# Patient Record
Sex: MALE | Race: BLACK OR AFRICAN AMERICAN | NOT HISPANIC OR LATINO | Employment: UNEMPLOYED | URBAN - METROPOLITAN AREA
[De-identification: names, ages, dates, MRNs, and addresses within clinical notes are randomized per-mention and may not be internally consistent; named-entity substitution may affect disease eponyms.]

---
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
## 2020-07-14 ENCOUNTER — TELEPHONE (OUTPATIENT)
Dept: PEDIATRIC CARDIOLOGY | Facility: CLINIC | Age: 20
End: 2020-07-14

## 2020-07-14 NOTE — TELEPHONE ENCOUNTER
----- Message from Uche Avina MA sent at 7/14/2020  3:44 PM CDT -----  Regarding: Letter  Mom doesn't want Beulah to go to work at the [facility] [employer] of his heart condition. She wanted us to write a letter stating he shouldn't work. I advised mom that we are following CDC guidelines and aren't restricting any of our patients from working. I told her we may could write something up providing his diagnosis. Mom wanted that and it to be sent to fax # [phone] attn: Semaj Oro (Beulah's supervisor) and to call mom at [phone] when we are done.

## 2020-07-14 NOTE — TELEPHONE ENCOUNTER
Called mom- Beulah seen last in Jan 2019 and was asked to RTC in 6 months. Have a general guideline sheet from last visit with diagnoses on top- happy to send to manager. Can evaluate any further activity recommendations at visit on 08/04/2020.    Called mom to review/update- mom understands and agrees with sending general guideline sheet at this time. Told mom we can evaluate further at f/u on 08/04/2020- mom verbalizes understanding. All questions answered.

## 2020-08-04 ENCOUNTER — CLINICAL SUPPORT (OUTPATIENT)
Dept: PEDIATRIC CARDIOLOGY | Facility: CLINIC | Age: 20
End: 2020-08-04
Payer: MEDICAID

## 2020-08-04 ENCOUNTER — CLINICAL SUPPORT (OUTPATIENT)
Dept: PEDIATRIC CARDIOLOGY | Facility: CLINIC | Age: 20
End: 2020-08-04
Attending: NURSE PRACTITIONER
Payer: MEDICAID

## 2020-08-04 ENCOUNTER — OFFICE VISIT (OUTPATIENT)
Dept: PEDIATRIC CARDIOLOGY | Facility: CLINIC | Age: 20
End: 2020-08-04
Payer: MEDICAID

## 2020-08-04 VITALS
RESPIRATION RATE: 16 BRPM | OXYGEN SATURATION: 96 % | HEIGHT: 69 IN | WEIGHT: 161.38 LBS | DIASTOLIC BLOOD PRESSURE: 66 MMHG | SYSTOLIC BLOOD PRESSURE: 122 MMHG | HEART RATE: 45 BPM | BODY MASS INDEX: 23.9 KG/M2

## 2020-08-04 DIAGNOSIS — Q20.5 CONGENITALLY CORRECTED TGA (TRANSPOSITION OF GREAT ARTERIES): ICD-10-CM

## 2020-08-04 DIAGNOSIS — Z98.890 S/P FONTAN PROCEDURE: ICD-10-CM

## 2020-08-04 DIAGNOSIS — R25.1 OCCASIONAL TREMORS: ICD-10-CM

## 2020-08-04 DIAGNOSIS — T82.111S: ICD-10-CM

## 2020-08-04 DIAGNOSIS — D69.1 ABNORMAL PLATELETS: ICD-10-CM

## 2020-08-04 DIAGNOSIS — I35.1 NONRHEUMATIC AORTIC VALVE INSUFFICIENCY: ICD-10-CM

## 2020-08-04 DIAGNOSIS — Z95.0 S/P CARDIAC PACEMAKER PROCEDURE: ICD-10-CM

## 2020-08-04 DIAGNOSIS — Z98.890 S/P DIVISION OF VASCULAR RING: ICD-10-CM

## 2020-08-04 DIAGNOSIS — R94.31 ABNORMAL EKG: ICD-10-CM

## 2020-08-04 DIAGNOSIS — I36.1 NON-RHEUMATIC TRICUSPID VALVE INSUFFICIENCY: ICD-10-CM

## 2020-08-04 DIAGNOSIS — I25.41 CORONARY ARTERY FISTULA: ICD-10-CM

## 2020-08-04 DIAGNOSIS — D50.9 IRON DEFICIENCY ANEMIA, UNSPECIFIED IRON DEFICIENCY ANEMIA TYPE: ICD-10-CM

## 2020-08-04 DIAGNOSIS — I34.0 NON-RHEUMATIC MITRAL REGURGITATION: ICD-10-CM

## 2020-08-04 PROCEDURE — 99215 PR OFFICE/OUTPT VISIT, EST, LEVL V, 40-54 MIN: ICD-10-PCS | Mod: 25,S$GLB,, | Performed by: NURSE PRACTITIONER

## 2020-08-04 PROCEDURE — 93000 PR ELECTROCARDIOGRAM, COMPLETE: ICD-10-PCS | Mod: 59,S$GLB,, | Performed by: PEDIATRICS

## 2020-08-04 PROCEDURE — 99215 OFFICE O/P EST HI 40 MIN: CPT | Mod: 25,S$GLB,, | Performed by: NURSE PRACTITIONER

## 2020-08-04 PROCEDURE — 93000 ELECTROCARDIOGRAM COMPLETE: CPT | Mod: 59,S$GLB,, | Performed by: PEDIATRICS

## 2020-08-04 NOTE — LETTER
August 6, 2020      Frances Cat MD  [facility] [id number] [place] [address] Cardiology  [address]  [facility] [address]  Phone: [phone]  Fax: [phone]          Patient: Beulah Galindo   MR Number: [id number]   YOB: 2000   Date of Visit: 8/4/2020       Dear Dr. Frances Cat:    Thank you for referring Beulah Galindo to me for evaluation. Attached you will find relevant portions of my assessment and plan of care.    If you have questions, please do not hesitate to call me. I look forward to following Beulah Galindo along with you.    Sincerely,    ADRIÁN Whipple,PNP-C    Enclosure  CC:  No Recipients    If you would like to receive this communication electronically, please contact [email or web address] or [phone] to request more information on [employer] access.    For providers and/or their staff who would like to refer a patient to [facility], please contact us through our one-stop-shop provider referral line, [facility], at [phone].    If you feel you have received this communication in error or would no longer like to receive these types of communications, please e-mail [email or web address]

## 2020-08-04 NOTE — PATIENT INSTRUCTIONS
Alex Frederick MD  Pediatric Cardiology  [address] [place] [address]  [phone]    General Guidelines    Name: Beulah Galindo                   : 2000    Diagnosis:   1. 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 S/P Fontan procedure    3. S/P cardiac pacemaker procedure    4. Pacemaker malfunction, sequela    5. Abnormal EKG    6. Non-rheumatic mitral regurgitation    7. Non-rheumatic tricuspid valve insufficiency        PCP: Frances Cat MD  PCP Phone Number: [phone]    · If you have an emergency or you think you have an emergency, go to the nearest emergency room!     · Breathing too fast, doesnt look right, consistently not eating well, your child needs to be checked. These are general indications that your child is not feeling well. This may be caused by anything, a stomach virus, an ear ache or heart disease, so please call Frances Cat MD. If Frances Cat MD thinks you need to be checked for your heart, they will let us know.     · If your child experiences a rapid or very slow heart rate and has the following symptoms, call Francse Cat MD or go to the nearest emergency room.   · unexplained chest pain   · does not look right   · feels like they are going to pass out   · actually passes out for unexplained reasons   · weakness or fatigue   · shortness of breath  or breathing fast   · consistent poor feeding     · If your child experiences a rapid or very slow heart rate that lasts longer than 30 minutes call Frances Cat MD or go to the nearest emergency room.     · If your child feels like they are going to pass out - have them sit down or lay down immediately. Raise the feet above the head (prop the feet on a chair or the wall) until the feeling passes. Slowly allow the child to sit, then stand. If the feeling returns, lay back down and start over.     It is very important that you notify Frances Cat MD first.  Frances Cat MD or the ER Physician can reach Dr. Alex Frederick at the office or through [facility] at [phone] as needed.    Call our office ([phone]) one week after ALL tests for results.       PREVENTION OF BACTERIAL ENDOCARDITIS (selective IE)    A COPY OF THIS SHEET MUST BE GIVEN TO ALL OF YOUR DOCTORS OR HEALTH CARE PROVIDERS    You have received this information because you are at an increased risk for developing adverse outcomes from infective endocarditis (IE), also known as subacute bacterial endocarditis (SBE).    Patient Name:  Beulah Galindo    : 2000   Diagnosis:   1. 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 S/P Fontan procedure    3. S/P cardiac pacemaker procedure    4. Pacemaker malfunction, sequela    5. Abnormal EKG    6. Non-rheumatic mitral regurgitation    7. Non-rheumatic tricuspid valve insufficiency        As of 2020, Alex Frederick MD, Pediatric Cardiologist recommends that Beulah receive SELECTIVE USE of antibiotic prophylaxis from bacterial endocarditis.    Antibiotic prophylaxis with dental or surgical procedures is recommended in selected instances if your dentist, surgeon or physician believes there is a greater risk of infection.  For example:  1) Any significantly infected operative field (Example: dental abscess or ruptured appendix) which may increase the bacterial load to the blood stream during the procedure; 2) Benefits of antibiotic coverage should be weighed against risk of allergic reactions and anaphylaxis; therefore, their use should be carefully selected based on individual cases.     Antibiotic prophylaxis is NOT recommended for the following dental procedures or events: routine anesthetic injections through non-infected tissue; taking dental radiographs; placement of removable prosthodontic or orthodontic appliances; adjustment of orthodontic appliances; placement of orthodontic brackets; and shedding  of deciduous teeth or bleeding from trauma to the lips or oral mucosa.   If recommended by the Health Care Provider - Antibiotic Prophylactic Regimens   Regimen - Single Dose 30-60 minutes before Procedure  Situation Agent Adults Children   Oral Amoxicillin 2g 50/mg/kg   Unable to take oral meds Ampicillin   OR  Cefazolin or ceftriaxone 2 g IM or IV1    1 g IM or IV 50 mg/kg IM or IV    50 mg/kg IM or IV   Allergic to Penicillins or ampicillin-Oral regimen Cephalexin 2  OR  Clindamycin  OR  Azithromycin or clarithromycin 2 g    600 mg    500 mg 50 mg/kg    20 mg/kg    15 mg/kg   Allergic to penicillin or ampicillin and unable to take oral medications Cefazolin or ceftriaxone 3  OR  Clindamycin 1 g IM or IV    600 mg IM or IV 50 mg/kg IM or IV    20 mg/kg IM or IV   1IM - intramuscular; IV - intravenous  2Or other first or second generation oral cephalosporin in equivalent adult or pediatric dosage.  3Cephalosporins should not be used in an individual with a history of anaphylaxis, angioedema or urticaria with penicillin or ampicillin.   Adapted from Prevention of Infective Endocarditis: Guidelines From the American Heart Association, by the Committee on Rheumatic Fever, Endocarditis, and Kawasaki Disease. Circulation, e-published April 19, 2007. Go to www.americanheart.org/presenter for more information.    The practice of giving patients antibiotics prior to a dental procedure is no longer recommended EXCEPT for patients with the highest risk of adverse outcomes resulting from bacterial endocarditis. We cannot exclude the possibility that an exceedingly small number of cases, if any, of bacterial endocarditis may be prevented by antibiotic prophylaxis prior to a dental procedure. The importance of good oral and dental health and regular visits to the dentist is important for patients at risk for bacterial endocarditis.  Gastrointestinal (GI)/Genitourinary () Procedures: Antibiotic prophylaxis solely to prevent  bacterial endocarditis is no longer recommended for patients who undergo a GI or  tract procedures, including patients with the highest risk of adverse outcomes due to bacterial endocarditis.    Good dental health and hygiene is very effective in preventing bacterial endocarditis.   Always practice good dental health!

## 2020-08-04 NOTE — PROGRESS NOTES
[facility]  Beulah Galindo  2000    Beulah Galindo is a 20 y.o. male presenting for follow-up of L-TGA with ventricular inversion, VSD, pulmonary stenosis s/p Fontan (2003), surgical heart block s/p pacemaker (2015 gen replacement) with fractured lead.  Beulah is here today with his mother.      HPI  Beulah was referred for murmur at 6 months of age and subsequently diagnosed with congenital corrected transposition of great vessels s/p staged palliation to 18mm extracardiac Fontan and ligation of pulmonary artery ([facility], 10/2003). In 2004, he was found to have thrombus in non-systemic LV and restrictive VSD. He was treated with anticoagulation and thrombus resolved; VSD was created surgically. He had bipolar pacemaker placed in 2005 for surgical AV block; generator replacement and fractured atrial lead capped (2007), generator replacement (2015), RV lead fracture since 2017.    Beulah was last seen by EP (Dr. Goodman) in November 2018. No intervention recommended at that time since he was asymptomatic. Most recent device interrogation done 1/24/19 revealed VVI with low rate of 30bpm; underlying rhythm junctional with intermittent sinus beats    Beulah was last seen by Dr. Frederick in January 2019 and was reportedly doing well at that time from a cardiovascular perspective. Our exam that day revealed grade 1/6 systolic murmur noted over left precordium, grade 1-2/6 systolic murmur at apex, bilateral hand tremors, abnormal EKG with CBBB and ST coving. Family was asked to return in 6 months with interim labs, echo, EP follow-up. They come today for late follow-up.     Beulah has a history of tremors, evaluated by Dr. Heard and felt to be essential and may be secondary to anxiety. Vitamin D level was low so he was advised to supplement; LISBETH was positive. Mother reports that she still notices tremors at times, sometimes so badly that she is afraid he will drop the item in his hands. She reports that  the most recent time she noticed was when he was picking out an outfit to propose in, so he may have been a bit anxious or excited at that time.     Beulah and mother present today and report that he has been doing very well. He had been working prior to COVID, has a girlfriend who he is planning to propose to soon, is off of all depression medications, and feels great. He denies any fatigue, chest pain, palpitation, dizziness, syncope, etc.       No current outpatient medications on file.    Allergies:   Review of patient's allergies indicates:   Allergen Reactions    Omnicef [cefdinir]        The patient's family history includes No Known Problems in his brother, father, and mother.    Beulah Galindo  has a past medical history of Abnormal EKG, Congenitally corrected TGA (transposition of great arteries), Mitral regurgitation, Pacemaker, Surgical complete heart block, Tricuspid regurgitation, and VSD (ventricular septal defect).     Past Surgical History:   Procedure Laterality Date    BIDIRECTIONAL MEENA W/ ATRIAL SEPTECTOMY  9/23/2002    PA banding and Atrial septectomy; [facility]    [facility]AC [facility]  [facility]    CARDIAC [facility]HETERIZATION  [facility]/17/2003    [facility]    CARDIAC PACEMAKER PLACEMENT  2005    Bipolar pacemaker insertion    CARDIAC SURGERY  2000    [facility]; Aortopexy of Kommerell's diverticulum    CARDIAC SURGERY  2/27/2004    Creation of large mid-muscular VSD; [facility]    FONTAN PROCEDURE, EXTRACARDIAC  10/15/2003    18mm Harwood-Roberto and complete ligation of PA    PACEMAKER GENERATOR CHANGE  10/3/2007    [facility]    PACEMAKER GENERATOR CHANGE  3/18/2015    [facility]     Birth History    Birth     Weight: 3.345 kg (7 lb 6 oz)     **Patient was adopted** Uncomplicated pregnancy.     Social History     Social History Narrative    Lives with mother; has been working but needing activity sheet to allow him to return. Walks for exercise. Appetite is good. No  "tobacco products or vaping.         Review of Systems   Constitutional: Negative for activity change, appetite change and fatigue.   Respiratory: Negative for shortness of breath, wheezing and stridor.    Cardiovascular: Negative for chest pain and palpitations.   Gastrointestinal: Negative.    Genitourinary: Negative.    Musculoskeletal: Negative.    Skin: Negative for color change and rash.   Neurological: Negative for dizziness, seizures, syncope, weakness and headaches.   Hematological: Does not bruise/bleed easily.       Objective:   Vitals:    08/04/20 0825   BP: 122/66   BP Location: Right arm   Patient Position: Sitting   BP Method: Large (Manual)   Pulse: (!) 45   Resp: 16   SpO2: 96%   Weight: 73.2 kg (161 lb 6 oz)   Height: 5' 8.7" (1.745 m)       Physical Exam  Vitals signs and nursing note reviewed.   Constitutional:       General: He is awake. He is not in acute distress.     Appearance: Normal appearance. He is well-developed.   HENT:      Head: Normocephalic.   Neck:      Musculoskeletal: Normal range of motion.   Cardiovascular:      Rate and Rhythm: Normal rate and regular rhythm.  No extrasystoles are present.     Pulses:           Radial pulses are 2+ on the right side.        Femoral pulses are 2+ on the right side.     Heart sounds: S1 normal. Murmur (grade 2/6 systolic murmur starts at LLSB with radiation to ULSB and apex - likely TR and MR; no rumble) present. No S3 or S4 sounds.       Comments: There are no clicks, rumbles, rubs, lifts, taps, or thrills noted. Loud single, palpable S2.  Pulmonary:      Effort: Pulmonary effort is normal. No respiratory distress.      Breath sounds: Normal breath sounds.   Chest:      Chest wall: No deformity.      Comments: Well healed sternotomy and left thoracotomy.  Abdominal:      General: Abdomen is flat. Bowel sounds are normal. There is no distension.      Palpations: Abdomen is soft. There is no hepatomegaly or splenomegaly.      Comments: There are " no abdominal bruits noted. Pacer generator RUQ.   Musculoskeletal: Normal range of motion.   Skin:     General: Skin is warm and dry.      Findings: No rash.      Nails: There is no clubbing.        Comments: Horizontal striae noted on back.   Neurological:      Mental Status: He is alert and oriented to person, place, and time.   Psychiatric:         Attention and Perception: Attention normal.         Mood and Affect: Mood and affect normal.         Speech: Speech normal.         Behavior: Behavior normal. Behavior is cooperative.         Tests:   Today's EKG interpretation by Dr. Frederick reveals: junctional rhythm at rate of 46bpm with QRS axis +106 degrees in the frontal plane. There is rS pattern in V1, q in V6, nonspecific inferior T wave changes. No pacer spikes. EKG is abnormal. (Final report in electronic medical record)    Echo done today and interpreted by Dr. Martell:  L-TGA s/p Fontan with surgically created VSD, sub-pulmonary membrane, and surgical AV block s/p epicardial pacemaker.  The study was technically difficult with many images being suboptimal in quality.  Intact IVC to Fontan tunnel. The tunnel lateral to the atrium is not well seen. The SVC/Calvin is not welll seen. The pulmonary arteries are not well seen.  Prior echo with large atrial level communication, not well seen today.  Large mid-muscular VSD with bidirectional shunt.  The right sided AV valve is the mitral valve. Mild mitral valve insufficiency.  The left sided AV valve is the tricuspid valve. Systemic tricuspid valve. Mild tricuspid valve insufficiency.  The small pulmonary valve was not evaluated with color or pulse wave Doppler, unclear if there is antegrade pulmonary blood flow.  Large aortic valve annulus. Trivial aortic valve insufficiency.  The aortic arch is not well seen. There is flow in the arch with color Doppler with normal Doppler velocity.  No pericardial effusion.  The LV is right sided. Septal dyskinesis. Limited  views of the LV free wall with overall impression of mildly decreased LV systolic function.  The systemic RV is left sided. Dilated right ventricle, moderate. Thickened right ventricle free wall, moderate. Overall impression of low normal/mildly decreased RV function.  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  Limited views of the aortic and pulmonary outflows.     Holter 06/29/2018  Sinus rhythm with heart rates varying between 38 and 126 bpm with an average of 58 bpm.   There were rare PVCs totalling 230 and averaging 9 per hour.  There were 2 couplets.  There were no episodes of ventricular tachycardia.  There were very rare PACs recorded totalling 1 and averaging less than 1 per hour.   There were no episodes of sustained supraventricular tachycardia.  There was no evidence of high grade SA kelly block.   There was no evidence of high grade AV block.   The diary was returned, but not completed  This was a tape of adequate length (24 hrs       7/31/18 2/25/19 4/10/19 8/5/2020   CBC Hgb 14.2  Hct 47.2  Plt 151 Hgb 14.1  Hct 43.2  Plt 147 L Hgb 15.0  Hct 48.6  Plt 189 Hgb 14.3  Hct 45.5  Plt 170  MCV 75L  MCHC 31.4 L  MCH 23.5 L   CMP WNL WNL  WNL   Mg 2.4, WNL 2.3, WNL  2.1, WNL   BNP 15, WNL   16, WNL    H   104 H   PT/INR PT 15.9H INR 1.3   11.7 / 1.1   TSH 2.150, WNL   2.020 WNL     USV duplex abdomen pelvic or retroperitoneal 4/18/19: Unremarkable right upper quadrant ultrasound study. Hepatic vessels are patent.      Assessment:  1. 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 S/P Fontan procedure    3. S/P division of vascular ring    4. S/P cardiac pacemaker procedure    5. Pacemaker malfunction, sequela    6. Abnormal EKG    7. Non-rheumatic mitral regurgitation    8. Non-rheumatic tricuspid valve insufficiency    9. 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 Occasional tremors    12. History of abnormal platelets    13. Iron  deficiency anemia, unspecified iron deficiency anemia type        Discussion:   Dr. Frederick reviewed history and physical exam. He then performed the physical exam. He discussed the findings with the patient's caregiver(s), and answered all questions.    Congenitally corrected TGA (transposition of great arteries)  Dx at 6 months after being referred for heart murmur and cyanotic event (choking spell while eating). Cardiac cath revealed L-TOGV with ventricular inversion, large subaortic VSD, valvar PS and mild pulmonary annular hypoplasia, ASD, right aortic arch, aberrant left subclavian artery with compression of left mainstem bronchus and trachea.          S/P Fontan procedure  Staged palliation to 18mm extracardiac Fontan and ligation of pulmonary artery ([facility], 10/2003). Family is aware of long-term considerations regarding Fontan, including the effects of Fontan circulation on heart, lungs, and liver. Studies are due at 6 and 12 month intervals but have not been done in over one year, so all will be ordered today. We will send his information to Dr. Cade' team for the [facility] clinic.     Echo done today indicates intact IVC to Fontan tunnel, tunnel lateral to atrium not well seen, SVC/Calvin not well seen; mildly decreased LV systolic function, low normal / mildly decreased RV function. We have emphasized the importance of regular follow-up. SaO2 today is 93-94%; down from 97-98% in 2019. CBC done after the visit indicative of mild iron deficiency anemia, so H/H is unreliable to help determine need for cath / intervention of collaterals which tend to develop over time.     Liver ultrasound due 8/10/2020    Pacemaker malfunction, sequela  He had bipolar pacemaker placed in 2005 for surgical AV block; generator replacement and fractured atrial lead capped (2007), generator replacement (2015), RV lead fracture since 2017. Last seen by Dr. Goodman in November 2018; most recent interrogation done 1/24/19  which revealed VVI with low rate of 30bpm; underlying rhythm junctional with intermittent sinus beats. He has been asymptomatic; EKG today with junctional rhythm, rate 46bpm. Echo done today indicated mildly decreased LV systolic function, low normal / mildly decreased RV function. Holter was placed today and he will be seen by EP on 8/24/2020. We have reviewed that pacemaker lead replacement may be indicated if he has symptomatic bradycardia, decreased cardiac function, or prolonged pauses on holter. Dr. Norris will be able to give him more definitive answers about need and timing for that at her visit and pending holter / echo review.     Abnormal EKG  EKG with junctional rhythm, bradycardia with HR 46bpm, no pacer spikes.     Non-rheumatic mitral regurgitation  The right sided AV valve is the mitral valve due to ventricular inversion; mild mitral insufficiency, consistent with exam findings.     Non-rheumatic tricuspid valve insufficiency  The left sided AV valve is the tricuspid valve due to ventricular inversion; mild mitral insufficiency, consistent with exam findings.     Nonrheumatic aortic valve insufficiency  Large aortic valve annulus with trivial insufficiency, not noted on exam.    Coronary artery fistula  Probable coronary fistula in the area of VSD creation note don today's echo; no continuous murmurs noted. 2003 cath indicated two coronary arteries were present and their position was anatomically different than anticipated but compatible with L-TOGV.    Occasional tremors  He was seen and evaluated by Dr. Heard in 2019 for hand tremors; CT head was WNL, EEG WNL. He advised that tremors were essential and likely related to anxiety. Mother still notices the hand tremors on occasion; I have advised she follow-up with Dr. Heard or adult neurology if he has outgrown Dr. Heard's clinic.     History of abnormal platelets  See 4/11/19 telephone encounter: Per review with Dr. Merino, since his HCT is over 40  and his platelets are not below 100, he likely has acute platelet destruction and his bone marrow is releasing his platelets too soon and therefore seeing giant platelets. Not likely ITP, no cyanosis at last visit. His platelet destruction may be due to his surgically created VSD or possible portal vein thrombosis/portal HTN. Recommended checking fibrinogen and d-dimer, done 4/8/19 and normal.    CBC done today indicates Plt 170 and no mention of them being abnormal.    Iron deficiency anemia  Evidence of iron deficiency anemia on most recent CBC with MCV 75L, MCHC 31.4 L, MCH 23.5 L despite normal H/H. In patients with cyanotic heart disease and/or s/p Fontan procedure, H/H is helpful in determining timing of cath intervention. Over time, collaterals often develop and result in downward trending SaO2 and upward trending H/H. With evidence of iron deficiency anemia, however, the H/H is less reliable until the anemia is corrected. In this case, we have recommended iron rich foods be added to his diet and have provided a list for him. We will plan repeat CBC in 3-6 months.     I have reviewed our general guidelines related to cardiac issues with the family.  I instructed them in the event of an emergency to call 911 or go to the nearest emergency room.  They know to contact the PCP if problems arise or if they are in doubt.      Plan:    1. Activity:He can participate in normal age-appropriate activities. He should be allowed to set his own pace and rest if fatigued.  Regarding COVID, he is considered at increased risk due to his age and history of complex CHD. We have recommended vigilance for the family as they follow CDC recommendations.     2. Selective endocarditis prophylaxis is recommended in this circumstance.     3. Medications:   No current outpatient medications on file.     No current facility-administered medications for this visit.        4. Orders placed this encounter  Orders Placed This Encounter    Procedures    US Abdomen Limited    Brain Natriuretic Peptide    CBC auto differential    Comprehensive metabolic panel    Gamma GT    Magnesium    Protime-INR    TSH    Holter Monitor - 3-14 Day Pediatrics    EKG 12-lead pediatric    Echocardiogram pediatric       5. Follow up with the primary care provider for the following issues: Nothing identified.      Follow-Up:   Follow up for echo and 3 day holter today, labs and u/s in near future, EP and hepatology clinic in near future, [facility] clinic f/u and EKG in 3-6 months pending interim course and studies.       Sincerely,    Alex Frederick MD    Note Contributing Authors:  MD Rachna Myrick APRN, PNP-C

## 2020-08-06 ENCOUNTER — TELEPHONE (OUTPATIENT)
Dept: PEDIATRIC CARDIOLOGY | Facility: CLINIC | Age: 20
End: 2020-08-06

## 2020-08-06 LAB
ALBUMIN SERPL-MCNC: 5.2 G/DL (ref 4.1–5.2)
ALBUMIN/GLOB SERPL: 2.5 {RATIO} (ref 1.2–2.2)
ALP SERPL-CCNC: 68 IU/L (ref 39–117)
ALT SERPL-CCNC: 23 IU/L (ref 0–44)
AST SERPL-CCNC: 21 IU/L (ref 0–40)
BASOPHILS # BLD AUTO: 0.1 X10E3/UL (ref 0–0.2)
BASOPHILS NFR BLD AUTO: 1 %
BILIRUB SERPL-MCNC: 0.7 MG/DL (ref 0–1.2)
BNP SERPL-MCNC: 16 PG/ML (ref 0–100)
BUN SERPL-MCNC: 17 MG/DL (ref 6–20)
BUN/CREAT SERPL: 20 (ref 9–20)
CALCIUM SERPL-MCNC: 9.8 MG/DL (ref 8.7–10.2)
CHLORIDE SERPL-SCNC: 102 MMOL/L (ref 96–106)
CO2 SERPL-SCNC: 18 MMOL/L (ref 20–29)
CREAT SERPL-MCNC: 0.83 MG/DL (ref 0.76–1.27)
EOSINOPHIL # BLD AUTO: 0.2 X10E3/UL (ref 0–0.4)
EOSINOPHIL NFR BLD AUTO: 3 %
ERYTHROCYTE [DISTWIDTH] IN BLOOD BY AUTOMATED COUNT: 14.7 % (ref 11.6–15.4)
GGT SERPL-CCNC: 104 IU/L (ref 0–65)
GLOBULIN SER CALC-MCNC: 2.1 G/DL (ref 1.5–4.5)
GLUCOSE SERPL-MCNC: 84 MG/DL (ref 65–99)
HCT VFR BLD AUTO: 45.5 % (ref 37.5–51)
HGB BLD-MCNC: 14.3 G/DL (ref 13–17.7)
INR PPP: 1.1 (ref 0.8–1.2)
LYMPHOCYTES # BLD AUTO: 1.6 X10E3/UL (ref 0.7–3.1)
LYMPHOCYTES NFR BLD AUTO: 31 %
MAGNESIUM SERPL-MCNC: 2.1 MG/DL (ref 1.6–2.3)
MCH RBC QN AUTO: 23.5 PG (ref 26.6–33)
MCHC RBC AUTO-ENTMCNC: 31.4 G/DL (ref 31.5–35.7)
MCV RBC AUTO: 75 FL (ref 79–97)
MONOCYTES # BLD AUTO: 0.3 X10E3/UL (ref 0.1–0.9)
MONOCYTES NFR BLD AUTO: 6 %
NEUTROPHILS # BLD AUTO: 3 X10E3/UL (ref 1.4–7)
NEUTROPHILS NFR BLD AUTO: 59 %
PLATELET # BLD AUTO: 170 X10E3/UL (ref 150–450)
POTASSIUM SERPL-SCNC: 4.2 MMOL/L (ref 3.5–5.2)
PROT SERPL-MCNC: 7.3 G/DL (ref 6–8.5)
PROTHROMBIN TIME: 11.7 SEC (ref 9.1–12)
RBC # BLD AUTO: 6.08 X10E6/UL (ref 4.14–5.8)
SODIUM SERPL-SCNC: 139 MMOL/L (ref 134–144)
SPECIMEN STATUS REPORT: NORMAL
TSH SERPL DL<=0.005 MIU/L-ACNC: 2.02 UIU/ML (ref 0.45–4.5)
WBC # BLD AUTO: 5.2 X10E3/UL (ref 3.4–10.8)

## 2020-08-06 NOTE — TELEPHONE ENCOUNTER
Spoke to mother to provide update on testing thus far.     Echo with mild valvar dysfunction (unchanged), mildly decreased LV function (right sided chamber) and low normal / mildly decreased RV function (left sided systemic chamber).     Labs:  CBC with normal H/H but evidence of iron deficiency anemia, elevated GGT but normal liver enzymes, normal TSH, normal PT/INR, normal Mg, normal BNP.    Plan: TDK to review the echo again and have echo tech obtain FAC. Increase iron rich foods. Provide letter for work saying that he is considered higher risk due to cardiac history. Follow-up with EP for telemedicine visit.     Mother also reports that she has noticed him getting winded when walking around the house and his hand tremors are sometime bad enough that it seems he may drop the item he's holding. I suggested mother reach out to Dr. Flores's office again to see if he'd follow-up; if not, they should follow-up with PCP to get adult neurology referral.     Mother agreeable with plan. FAther and pilare will come to get the work letter and list of iron rich foods tomorrow morning.

## 2020-08-06 NOTE — LETTER
[facility]  [address] [address]  Phone: [phone]  Fax: [phone]         2020    Name: Beulah Galindo                 : 2000    Diagnosis:   1. 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 S/P Fontan procedure    3. S/P cardiac pacemaker procedure    4. Pacemaker malfunction, sequela    5. Abnormal EKG    6. Non-rheumatic mitral regurgitation    7. Non-rheumatic tricuspid valve insufficiency        To Whom It May Concern:    Beulah Galindo was last seen in this office on 2020. He has been followed for his entire life with complex congenital heart disease. Based on guidance from the [employer] College [employer], Beulah should be considered high risk for severe complications related to COVID due to his cardiac anatomy and oxygen saturation.     If you have any further questions, please do not hesitate to contact me.       MD Rachna Myrick APRN, PNP-C

## 2020-08-06 NOTE — LETTER
[facility]  [address] [address]  Phone: [phone]  Fax: [phone]       Iron rich foods    Food sources of iron  Iron is found in a few types of foods. Good sources include:  · Red meat, poultry, fish, eggs  · Dried fruits (especially raisins, prunes, figs)  · Legumes such as dried beans and lentils  · Breads and cereals with iron added  · Blackstrap molasses  · Spinach  · Foods cooked in cast-iron pans. This is especially true of acidic foods, such as tomatoes and dahiana.

## 2020-08-10 DIAGNOSIS — I97.89 SURGICAL COMPLETE HEART BLOCK: Primary | ICD-10-CM

## 2020-08-10 DIAGNOSIS — I44.2 SURGICAL COMPLETE HEART BLOCK: Primary | ICD-10-CM

## 2020-08-10 DIAGNOSIS — R94.31 ABNORMAL EKG: ICD-10-CM

## 2020-08-12 PROBLEM — D50.9 IRON DEFICIENCY ANEMIA: Status: ACTIVE | Noted: 2020-08-12

## 2020-08-12 PROBLEM — I25.41 CORONARY ARTERY FISTULA: Status: ACTIVE | Noted: 2020-08-12

## 2020-08-12 PROBLEM — D69.6 TEMPORARY LOW PLATELET COUNT: Status: RESOLVED | Noted: 2019-04-11 | Resolved: 2020-08-12

## 2020-08-12 PROBLEM — I35.1 NONRHEUMATIC AORTIC VALVE INSUFFICIENCY: Status: ACTIVE | Noted: 2020-08-12

## 2020-08-12 PROBLEM — Z98.890 S/P DIVISION OF VASCULAR RING: Status: ACTIVE | Noted: 2020-08-12

## 2020-08-12 NOTE — ASSESSMENT & PLAN NOTE
Dx at 6 months after being referred for heart murmur and cyanotic event (choking spell while eating). Cardiac cath revealed L-TOGV with ventricular inversion, large subaortic VSD, valvar PS and mild pulmonary annular hypoplasia, ASD, right aortic arch, aberrant left subclavian artery with compression of left mainstem bronchus and trachea.

## 2020-08-12 NOTE — ASSESSMENT & PLAN NOTE
Staged palliation to 18mm extracardiac Fontan and ligation of pulmonary artery ([facility], 10/2003). Family is aware of long-term considerations regarding Fontan, including the effects of Fontan circulation on heart, lungs, and liver. Studies are due at 6 and 12 month intervals but have not been done in over one year, so all will be ordered today. We will send his information to Dr. Cade' team for the [facility] hepatology clinic.     Echo done today indicates intact IVC to Fontan tunnel, tunnel lateral to atrium not well seen, SVC/Calvin not well seen; mildly decreased LV systolic function, low normal / mildly decreased RV function. We have emphasized the importance of regular follow-up. SaO2 today is 93-94%; down from 97-98% in 2019. CBC done after the visit indicative of mild iron deficiency anemia, so H/H is unreliable to help determine need for cath / intervention of collaterals which tend to develop over time.     Liver ultrasound due 8/10/2020

## 2020-08-12 NOTE — ASSESSMENT & PLAN NOTE
The right sided AV valve is the mitral valve due to ventricular inversion; mild mitral insufficiency, consistent with exam findings.

## 2020-08-12 NOTE — ASSESSMENT & PLAN NOTE
Probable coronary fistula in the area of VSD creation note don today's echo; no continuous murmurs noted. 2003 cath indicated two coronary arteries were present and their position was anatomically different than anticipated but compatible with L-TOGV.

## 2020-08-12 NOTE — ASSESSMENT & PLAN NOTE
See 4/11/19 telephone encounter: Per review with Dr. Merino, since his HCT is over 40 and his platelets are not below 100, he likely has acute platelet destruction and his bone marrow is releasing his platelets too soon and therefore seeing giant platelets. Not likely ITP, no cyanosis at last visit. His platelet destruction may be due to his surgically created VSD or possible portal vein thrombosis/portal HTN. Recommended checking fibrinogen and d-dimer, done 4/8/19 and normal.    CBC done today indicates Plt 170 and no mention of them being abnormal.

## 2020-08-12 NOTE — ASSESSMENT & PLAN NOTE
He had bipolar pacemaker placed in 2005 for surgical AV block; generator replacement and fractured atrial lead capped (2007), generator replacement (2015), RV lead fracture since 2017. Last seen by Dr. Goodman in November 2018; most recent interrogation done 1/24/19 which revealed VVI with low rate of 30bpm; underlying rhythm junctional with intermittent sinus beats. He has been asymptomatic; EKG today with junctional rhythm, rate 46bpm. Echo done today indicated mildly decreased LV systolic function, low normal / mildly decreased RV function. Holter was placed today and he will be seen by EP on 8/24/2020. We have reviewed that pacemaker lead replacement may be indicated if he has symptomatic bradycardia, decreased cardiac function, or prolonged pauses on holter. Dr. Norris will be able to give him more definitive answers about need and timing for that at her visit and pending holter / echo review.

## 2020-08-12 NOTE — ASSESSMENT & PLAN NOTE
He was seen and evaluated by Dr. Heard in 2019 for hand tremors; CT head was WNL, EEG WNL. He advised that tremors were essential and likely related to anxiety. Mother still notices the hand tremors on occasion; I have advised she follow-up with Dr. Heard or adult neurology if he has outgrown Dr. Heard's clinic.

## 2020-08-12 NOTE — ASSESSMENT & PLAN NOTE
Evidence of iron deficiency anemia on most recent CBC with MCV 75L, MCHC 31.4 L, MCH 23.5 L despite normal H/H. In patients with cyanotic heart disease and/or s/p Fontan procedure, H/H is helpful in determining timing of cath intervention. Over time, collaterals often develop and result in downward trending SaO2 and upward trending H/H. With evidence of iron deficiency anemia, however, the H/H is less reliable until the anemia is corrected. In this case, we have recommended iron rich foods be added to his diet and have provided a list for him. We will plan repeat CBC in 3-6 months.

## 2020-08-12 NOTE — ASSESSMENT & PLAN NOTE
The left sided AV valve is the tricuspid valve due to ventricular inversion; mild mitral insufficiency, consistent with exam findings.

## 2020-08-20 LAB
OHS CV EVENT MONITOR DAY: 3
OHS CV HOLTER LENGTH DECIMAL HOURS: 72
OHS CV HOLTER LENGTH HOURS: 0
OHS CV HOLTER LENGTH MINUTES: 0

## 2020-08-21 ENCOUNTER — TELEPHONE (OUTPATIENT)
Dept: PEDIATRIC CARDIOLOGY | Facility: CLINIC | Age: 20
End: 2020-08-21

## 2020-08-21 NOTE — TELEPHONE ENCOUNTER
Dr. Frederick reviewed Beulah Galindo abdominal ultrasound with Dr. Rod. Dr. Rod reviewed last two US were the right kidney could be seen and there is no change. Reviewed last 3 sets of chemistries which were WNL. Dr. Frederick  to follow clinically. Dr. Frederick recommended UA to be done. JJ Vasquez NP updated. See note written by TDK on abdominal US.

## 2020-08-24 ENCOUNTER — CLINICAL SUPPORT (OUTPATIENT)
Dept: PEDIATRIC CARDIOLOGY | Facility: CLINIC | Age: 20
End: 2020-08-24
Payer: MEDICAID

## 2020-08-24 ENCOUNTER — OFFICE VISIT (OUTPATIENT)
Dept: PEDIATRIC CARDIOLOGY | Facility: CLINIC | Age: 20
End: 2020-08-24
Payer: MEDICAID

## 2020-08-24 VITALS
BODY MASS INDEX: 24.23 KG/M2 | HEIGHT: 69 IN | RESPIRATION RATE: 18 BRPM | WEIGHT: 163.69 LBS | SYSTOLIC BLOOD PRESSURE: 126 MMHG | OXYGEN SATURATION: 96 % | HEART RATE: 58 BPM | OXYGEN SATURATION: 96 % | DIASTOLIC BLOOD PRESSURE: 76 MMHG | BODY MASS INDEX: 24.24 KG/M2 | RESPIRATION RATE: 18 BRPM | HEIGHT: 69 IN | WEIGHT: 163.56 LBS | SYSTOLIC BLOOD PRESSURE: 126 MMHG | DIASTOLIC BLOOD PRESSURE: 76 MMHG | HEART RATE: 58 BPM

## 2020-08-24 DIAGNOSIS — R94.31 ABNORMAL EKG: ICD-10-CM

## 2020-08-24 DIAGNOSIS — Z98.890 S/P FONTAN PROCEDURE: Chronic | ICD-10-CM

## 2020-08-24 DIAGNOSIS — Z95.0 S/P CARDIAC PACEMAKER PROCEDURE: ICD-10-CM

## 2020-08-24 DIAGNOSIS — Q24.9 ADULT CONGENITAL HEART DISEASE: ICD-10-CM

## 2020-08-24 DIAGNOSIS — Q20.5 CONGENITALLY CORRECTED TGA (TRANSPOSITION OF GREAT ARTERIES): Chronic | ICD-10-CM

## 2020-08-24 DIAGNOSIS — I97.89 SURGICAL COMPLETE HEART BLOCK: ICD-10-CM

## 2020-08-24 DIAGNOSIS — T82.110D PACEMAKER LEAD MALFUNCTION, SUBSEQUENT ENCOUNTER: ICD-10-CM

## 2020-08-24 DIAGNOSIS — I44.2 SURGICAL COMPLETE HEART BLOCK: Primary | ICD-10-CM

## 2020-08-24 DIAGNOSIS — Z98.890 S/P FONTAN PROCEDURE: Primary | Chronic | ICD-10-CM

## 2020-08-24 DIAGNOSIS — I97.89 SURGICAL COMPLETE HEART BLOCK: Primary | ICD-10-CM

## 2020-08-24 DIAGNOSIS — I44.2 SURGICAL COMPLETE HEART BLOCK: ICD-10-CM

## 2020-08-24 PROBLEM — T82.110A PACEMAKER LEAD MALFUNCTION: Status: ACTIVE | Noted: 2020-08-24

## 2020-08-24 PROCEDURE — 99214 PR OFFICE/OUTPT VISIT, EST, LEVL IV, 30-39 MIN: ICD-10-PCS | Mod: 95,S$PBB,, | Performed by: PEDIATRICS

## 2020-08-24 PROCEDURE — 99214 OFFICE O/P EST MOD 30 MIN: CPT | Mod: 95,S$PBB,, | Performed by: PEDIATRICS

## 2020-08-24 RX ORDER — NAPROXEN SODIUM 220 MG/1
81 TABLET, FILM COATED ORAL DAILY
COMMUNITY

## 2020-08-24 NOTE — PROGRESS NOTES
Thank you for referring your patient Beulah Galindo to the electrophysiology clinic for consultation. The patient is accompanied by his mother and brother. Please review my findings below.    CHIEF COMPLAINT: Follow up pacemaker evaluation    The patient location is: [facility]  The chief complaint leading to consultation is: Pacemaker    Visit type: audiovisual    Face to Face time with patient: 20  20 minutes of total time spent on the encounter, which includes face to face time and non-face to face time preparing to see the patient (eg, review of tests), Obtaining and/or reviewing separately obtained history, Documenting clinical information in the electronic or other health record, Independently interpreting results (not separately reported) and communicating results to the patient/family/caregiver, or Care coordination (not separately reported).     Each patient to whom he or she provides medical services by telemedicine is:  (1) informed of the relationship between the physician and patient and the respective role of any other health care provider with respect to management of the patient; and (2) notified that he or she may decline to receive medical services by telemedicine and may withdraw from such care at any time.        HISTORY OF PRESENT ILLNESS:   Beulah is a 20 y.o. 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  He was taking aspirin but mom stopped due to bruising.  Beulah underwent epicardial generator change on 3/18/15.      Beulah was last seen in EP clinic in 2018.  He returns today for scheduled follow up.  He has a lead fracture from April 2017.  He has been in a back up mode with intrinsic rhythm since.  Holter from August 2020 showed average heart rate of 61 bpm with no prolonged pauses and only rare PVC's.  He has no syncope or near syncope.  Mom feels that he might not recognize that he is tired.  He  has no difficulty breathing.  He has no fatigue or activity intolerance.  He has no chest pain or palpitations by report.    REVIEW OF SYSTEMS:   GENERAL: No fever, chills, fatigability or weight loss.  SKIN: No rashes, itching or changes in color or texture of skin.  CHEST: Denies CHANEY, cyanosis, wheezing, cough and sputum production.  CARDIOVASCULAR: Denies chest pain or reduced exercise tolerance.  ABDOMEN: Appetite fine. No weight loss. Denies diarrhea, abdominal pain, or vomiting.  PERIPHERAL VASCULAR: No claudication or cyanosis.  MUSCULOSKELETAL: No joint stiffness or swelling.   NEUROLOGIC: No history of seizures,  alteration of gait or coordination.    PAST MEDICAL HISTORY:   Past Medical History:   Diagnosis Date    Abnormal EKG     Congenitally corrected TGA (transposition of great arteries)     s/p Fontan    Mitral regurgitation     Pacemaker     Surgical complete heart block     Tricuspid regurgitation     VSD (ventricular septal defect)     surgically created     FAMILY HISTORY:   Family History   Problem Relation Age of Onset    No Known Problems Mother     No Known Problems Father     No Known Problems Brother     Heart attacks under age 50 Neg Hx     Sudden death Neg Hx         no scd <51 y/o    Congenital heart disease Neg Hx          SOCIAL HISTORY:   Social History     Socioeconomic History    Marital status: Single     Spouse name: Not on file    Number of children: Not on file    Years of education: Not on file    Highest education level: Not on file   Occupational History    Not on file   Social Needs    Financial resource strain: Not on file    Food insecurity     Worry: Not on file     Inability: Not on file    Transportation needs     Medical: Not on file     Non-medical: Not on file   Tobacco Use    Smoking status: Never Smoker   Substance and Sexual Activity    Alcohol use: Not on file    Drug use: Not on file    Sexual activity: Not on file   Lifestyle     "Physical activity     Days per week: Not on file     Minutes per session: Not on file    Stress: Not on file   Relationships    Social connections     Talks on phone: Not on file     Gets together: Not on file     Attends Confucianist service: Not on file     Active member of club or organization: Not on file     Attends meetings of clubs or organizations: Not on file     Relationship status: Not on file   Other Topics Concern    Not on file   Social History Narrative    Lives with mother; has been working but needing activity sheet to allow him to return. Walks for exercise. Appetite is good. No tobacco products or vaping.        ALLERGIES:  Allergies   Allergen Reactions    Omnicef [Cefdinir]        MEDICATIONS:    Current Outpatient Medications:     aspirin 81 MG Chew, Take 81 mg by mouth once daily., Disp: , Rfl:       PHYSICAL EXAM:   Vitals:    08/24/20 1439   BP: 126/76   BP Location: Right arm   Pulse: (!) 58   Resp: 18   SpO2: 96%   Weight: 74.2 kg (163 lb 9.3 oz)   Height: 5' 8.5" (1.74 m)       Gen:  Awake, alert, NAD  HEENT:  NCAT, MMM and pink  Chest:  No respiratory distress  Neuro:  Grossly nonfocal    STUDIES:  ECG: Junctional rhythm alternating with sinus with some likely sinus capture beats conducted aberrantly (51 bpm)      ECHO 2020:  Interpretation Summary  L-TGA s/p Fontan with surgically created VSD, sub-pulmonary membrane, and surgical AV block s/p epicardial pacemaker.  The study was technically difficult with many images being suboptimal in quality.  Intact IVC to Fontan tunnel. The tunnel lateral to the atrium is not well seen. The SVC/Calvin is not welll seen. The pulmonary  arteries are not well seen.  Prior echo with large atrial level communication, not well seen today.  Large mid-muscular VSD with bidirectional shunt.  The right sided AV valve is the mitral valve. Mild mitral valve insufficiency.  The left sided AV valve is the tricuspid valve. Systemic tricuspid valve. Mild " tricuspid valve insufficiency.  The small pulmonary valve was not evaluated with color or pulse wave Doppler, unclear if there is antegrade pulmoanry blood  flow.  Large aortic valve annulus. Trivial aortic valve insufficiency.  The aortic arch is not well seen. There is flow in the arch with color Doppler with normal Doppler velocity.  No pericardial effusion.  The LV is right sided. Septal dyskinesis. Limited views of the LV free wall with overall impression of mildly decreased LV  systolic function.  The systemic RV is left sided. Dilated right ventricle, moderate. Thickened right ventricle free wall, moderate. Overall  impression of low normal/mildly decreased RV function.  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  Limited views of the aortic and pulmonary outflows.    ASSESSMENT:  20 y.o. Fontan with pacemaker from post-op AV block.  He has a single chamber epicardial pacemaker that is fractured since April 2017.  It has been set to low output at VVI 30.  Will continue to monitor for now as patient is asymptomatic with intrinsic rhythm.  Discussed possible need for pacemaker revision in future especially if any symptoms or if arrhythmias develop.    PLAN:   - Agree with annual Holter and ECHO.  - Exercise treadmill test  - F/u in 6 months in EP clinic with ECG and Holter  - Call for palpitations, chest pain, further abdominal pain, fatigue, syncope, or any other questions or concerns in the interim.  - SBE prophylaxis as per Dr. Frederick.  - Keep follow up with Dr. Frederick as planned.       Time Spent: 20 (min) with over 50% in direct patient and family consultation regarding management and prognosis with heart block s/p Fontan with epicardial pacemaker with ventricular lead fracture.      The patient's doctor will be notified via EPIC    I hope this brings you up-to-date on Beulah Galindo  Please contact me with any questions or concerns.    Patricia Norris M.D.  Pediatric Cardiology  Pediatric  Electrophysiology

## 2020-08-24 NOTE — LETTER
August 25, 2020      Frances Cat MD  [address]  [place] [address]           [facility]  [address]  [facility] [address]  Phone: [phone]  Fax: [phone]          Patient: Beulah Galindo   MR Number: [id number]   YOB: 2000   Date of Visit: 8/24/2020       Dear Dr. Frances Cat:    Thank you for referring Beulah Galindo to me for evaluation. Attached you will find relevant portions of my assessment and plan of care.    If you have questions, please do not hesitate to call me. I look forward to following Beulah Galindo along with you.    Sincerely,    Patricia Norris MD    Enclosure  CC:  No Recipients    If you would like to receive this communication electronically, please contact [email or web address] or [phone] to request more information on [employer] access.    For providers and/or their staff who would like to refer a patient to [facility], please contact us through our one-stop-shop provider referral line, [facility], at [phone].    If you feel you have received this communication in error or would no longer like to receive these types of communications, please e-mail [email or web address]

## 2020-09-29 ENCOUNTER — TELEPHONE (OUTPATIENT)
Dept: PEDIATRIC CARDIOLOGY | Facility: CLINIC | Age: 20
End: 2020-09-29

## 2020-09-29 NOTE — TELEPHONE ENCOUNTER
Spoke to Dr Cade; [facility] schedule for October and November not finalized yet; will wait until they are to reach out to mother

## 2020-09-29 NOTE — TELEPHONE ENCOUNTER
Spoke to mom- she apologized for not being able to make the appt yesterday. I did explain the importance of the visit with Dr. Cade- mom verbalizes understanding. Mom unsure of how to reach the office for rescheduling (she said when she called [facility], they were unsure how to help with [place] office but [place] office did not have personal except on day of clinic). Will send a message to Dr. Cade staff to have someone reach out to mom and help RS for next [place] clinic. Mom verbalizes understanding.

## 2020-09-29 NOTE — TELEPHONE ENCOUNTER
----- Message from ADRIÁN Whipple,PNP-C sent at 9/29/2020  8:06 AM CDT -----  Regarding: FW: [place] [facility] clinic - Fontan patient  Please call mother. There's a note in the chart that they were all sick but didn't know why they were supposed to be there. Please explain that this is the hepatology doctor who is very important because of the Fontan effects on the liver. Appointments are limited and important. I'm not 100% sure how we go about getting them back on the schedule.    Rachna  ----- Message -----  From: Noel Cade MD  Sent: 9/28/2020   2:19 PM CDT  To: ADRIÁN Whipple,PNP-C  Subject: RE: [place] hepatology [facility] - Fontan pa#    Just FYI, this young man didn't turn up this morning.  Happy to see him next time if they want to reschedule.  Noel  ----- Message -----  From: Rachna Freedmna RN  Sent: 8/13/2020   8:10 AM CDT  To: Noel Cade MD  Subject: FW: [place] [facility] - Fontan pa#    I have added Beulah to my [place] list!  ----- Message -----  From: ADRIÁN Whipple,PNP-C  Sent: 8/12/2020   5:34 PM CDT  To: [facility] Staff  Subject: [place] [facility]ology clinic - Fontan patient    Please add Beulah to the list for [place] hepatology clinic. He is 20y and stable; most recent studies include labs 8/5/2020 - normal liver enzymes, elevated GGT (104); liver ultrasound 4/18/19 WNL.     USV duplex abdomen pelvic or retroperitoneal 4/18/19: Unremarkable right upper quadrant ultrasound study. Hepatic vessels are patent. Repeat liver ultrasound done 8/10/2020; results pending.     Thanks!    ADRIÁN Paul  [facility]

## 2020-10-07 ENCOUNTER — TELEPHONE (OUTPATIENT)
Dept: PEDIATRIC GASTROENTEROLOGY | Facility: CLINIC | Age: 20
End: 2020-10-07

## 2020-10-07 NOTE — TELEPHONE ENCOUNTER
Called mother to offer assistance in rescheduling appointment with Dr Cade in [place] at the end of this month; mother states that she is not able to take off of work this month for clinic but requests we postpone visit until the December clinic; acknowledged and noted mother's request; will follow-up with mother to schedule December visit once schedule opens

## 2020-11-24 ENCOUNTER — OFFICE VISIT (OUTPATIENT)
Dept: PEDIATRIC CARDIOLOGY | Facility: CLINIC | Age: 20
End: 2020-11-24
Payer: MEDICAID

## 2020-11-24 ENCOUNTER — TELEPHONE (OUTPATIENT)
Dept: PEDIATRIC GASTROENTEROLOGY | Facility: CLINIC | Age: 20
End: 2020-11-24

## 2020-11-24 VITALS
HEIGHT: 70 IN | DIASTOLIC BLOOD PRESSURE: 78 MMHG | HEART RATE: 55 BPM | SYSTOLIC BLOOD PRESSURE: 124 MMHG | BODY MASS INDEX: 22.83 KG/M2 | OXYGEN SATURATION: 95 % | WEIGHT: 159.5 LBS | RESPIRATION RATE: 18 BRPM

## 2020-11-24 DIAGNOSIS — T82.111S: ICD-10-CM

## 2020-11-24 DIAGNOSIS — R25.1 OCCASIONAL TREMORS: ICD-10-CM

## 2020-11-24 DIAGNOSIS — R94.31 ABNORMAL EKG: ICD-10-CM

## 2020-11-24 DIAGNOSIS — I34.0 NON-RHEUMATIC MITRAL REGURGITATION: ICD-10-CM

## 2020-11-24 DIAGNOSIS — Z98.890 S/P DIVISION OF VASCULAR RING: ICD-10-CM

## 2020-11-24 DIAGNOSIS — I36.1 NON-RHEUMATIC TRICUSPID VALVE INSUFFICIENCY: ICD-10-CM

## 2020-11-24 DIAGNOSIS — Z98.890 S/P FONTAN PROCEDURE: Chronic | ICD-10-CM

## 2020-11-24 DIAGNOSIS — I35.1 NONRHEUMATIC AORTIC VALVE INSUFFICIENCY: ICD-10-CM

## 2020-11-24 DIAGNOSIS — Q20.5 CONGENITALLY CORRECTED TGA (TRANSPOSITION OF GREAT ARTERIES): Primary | Chronic | ICD-10-CM

## 2020-11-24 DIAGNOSIS — D69.1 ABNORMAL PLATELETS: ICD-10-CM

## 2020-11-24 DIAGNOSIS — I25.41 CORONARY ARTERY FISTULA: ICD-10-CM

## 2020-11-24 DIAGNOSIS — Q21.0 VSD (VENTRICULAR SEPTAL DEFECT): ICD-10-CM

## 2020-11-24 DIAGNOSIS — Z95.0 S/P CARDIAC PACEMAKER PROCEDURE: ICD-10-CM

## 2020-11-24 PROCEDURE — 99214 OFFICE O/P EST MOD 30 MIN: CPT | Mod: 25,S$GLB,, | Performed by: NURSE PRACTITIONER

## 2020-11-24 PROCEDURE — 93000 ELECTROCARDIOGRAM COMPLETE: CPT | Mod: S$GLB,,, | Performed by: PEDIATRICS

## 2020-11-24 PROCEDURE — 99214 PR OFFICE/OUTPT VISIT, EST, LEVL IV, 30-39 MIN: ICD-10-PCS | Mod: 25,S$GLB,, | Performed by: NURSE PRACTITIONER

## 2020-11-24 PROCEDURE — 93000 PR ELECTROCARDIOGRAM, COMPLETE: ICD-10-PCS | Mod: S$GLB,,, | Performed by: PEDIATRICS

## 2020-11-24 NOTE — PROGRESS NOTES
[facility]  Beulah Galindo  2000    Beulah Galindo is a 20 y.o. male presenting for follow-up of L-TGA with ventricular inversion, VSD, pulmonary stenosis s/p Fontan (2003), surgical heart block s/p pacemaker (2015 gen replacement) with fractured lead.   Beulah is here today with his mother.    HPI  Beulah was referred for murmur at 6 months of age and subsequently diagnosed with congenital corrected transposition of great vessels s/p staged palliation to 18mm extracardiac Fontan and ligation of pulmonary artery ([facility], 10/2003). In 2004, he was found to have thrombus in non-systemic LV and restrictive VSD. He was treated with anticoagulation and thrombus resolved; VSD was created surgically. He had bipolar pacemaker placed in 2005 for surgical AV block; generator replacement and fractured atrial lead capped (2007), generator replacement (2015), RV lead fracture since 2017.     Beulah has a history of tremors, evaluated by Dr. Heard and felt to be essential and may be secondary to anxiety. Vitamin D level was low so he was advised to supplement; LISBETH was positive. Mother reports that she still notices tremors at times.     He has a history of abnormal platelets, see 4/11/19 telephone encounter: Per review with Dr. Merino, since his HCT is over 40 and his platelets are not below 100, he likely has acute platelet destruction and his bone marrow is releasing his platelets too soon and therefore seeing giant platelets. Not likely ITP, no cyanosis at last visit. His platelet destruction may be due to his surgically created VSD or possible portal vein thrombosis/portal HTN. Recommended checking fibrinogen and d-dimer, done 4/8/19 and normal.    He was last seen in August 2020 and at that time was doing well with no complaints. His exam that day revealed a grade 2/6 systolic murmur that started at the lower left sternal border with radiation to the upper left sternal border and apex.  Loud single  palpable S2.  EKG with junctional rhythm, bradycardia with heart rate 46, no pacer spikes.  Annual Fontan labs were ordered along with abdominal ultrasound. F/u was planned for 3-6 months pending studies.  He is not working currently due to COVID concerned.  He is engaged to be  and off all depression medicines.     He was seen by Dr. Norris on 8/24/20. ASSESSMENT:  20 y.o. Fontan with pacemaker from post-op AV block.  He has a single chamber epicardial pacemaker that is fractured since April 2017.  It has been set to low output at VVI 30.  Will continue to monitor for now as patient is asymptomatic with intrinsic rhythm.  Discussed possible need for pacemaker revision in future especially if any symptoms or if arrhythmias develop.     PLAN:   - Agree with annual Holter and ECHO.  - Exercise treadmill test (was not scheduled)  - F/u in 6 months in EP clinic with ECG and Holter    Family missed the initial visit with Dr. Cade but is now due to see him in December.     Mom states Beulah has been doing well since last visit. Mom states Beulah may be SOB but the pt states he not feel short of breath with activity.  He has increased his intake of iron rich foods.  Denies any recent illness, surgeries, or hospitalizations.    There are no reports of chest pain, chest pain with exertion, cyanosis, exercise intolerance, dyspnea, fatigue, palpitations, syncope and tachypnea. No other cardiovascular or medical concerns are reported.     Current Medications:   Current Outpatient Medications on File Prior to Visit   Medication Sig Dispense Refill    aspirin 81 MG Chew Take 81 mg by mouth once daily.       No current facility-administered medications on file prior to visit.      Allergies:   Review of patient's allergies indicates:   Allergen Reactions    Omnicef [cefdinir]          Family History   Problem Relation Age of Onset    No Known Problems Mother     No Known Problems Father     No Known Problems Brother      Heart attacks under age 50 Neg Hx     Sudden death Neg Hx         no scd <51 y/o    Congenital heart disease Neg Hx      Past Medical History:   Diagnosis Date    Abnormal blood chemistry test     h/o abnormal platelets    Abnormal EKG     Aortic insufficiency     Congenitally corrected TGA (transposition of great arteries)     s/p Fontan    Coronary artery fistula     Iron deficiency anemia     Mitral regurgitation     Occasional tremors     Pacemaker     S/P division of vascular ring     Surgical complete heart block     Tricuspid regurgitation     VSD (ventricular septal defect)     surgically created     Social History     Socioeconomic History    Marital status: Single     Spouse name: Not on file    Number of children: Not on file    Years of education: Not on file    Highest education level: Not on file   Occupational History    Not on file   Social Needs    Financial resource strain: Not on file    Food insecurity     Worry: Not on file     Inability: Not on file    Transportation needs     Medical: Not on file     Non-medical: Not on file   Tobacco Use    Smoking status: Never Smoker   Substance and Sexual Activity    Alcohol use: Not on file    Drug use: Not on file    Sexual activity: Not on file   Lifestyle    Physical activity     Days per week: Not on file     Minutes per session: Not on file    Stress: Not on file   Relationships    Social connections     Talks on phone: Not on file     Gets together: Not on file     Attends Denominational service: Not on file     Active member of club or organization: Not on file     Attends meetings of clubs or organizations: Not on file     Relationship status: Not on file   Other Topics Concern    Not on file   Social History Narrative    Lives with mother; has been working but needing activity sheet to allow him to return. Walks for exercise. Appetite is good. No tobacco products or vaping.      Past Surgical History:   Procedure  "Laterality Date    BIDIRECTIONAL MEENA W/ ATRIAL SEPTECTOMY  9/23/2002    PA banding and [facility] septectomy; [facility]    CARDIAC CATHETERIZATION  2000    [facility]    CARDIAC CATHETERIZATION  6/17/2003    [facility]    CARDIAC PACEMAKER PLACEMENT  2005    Bipolar pacemaker insertion    CARDIAC SURGERY  2000    [facility]; Aortopexy of Kommerell's diverticulum    CARDIAC SURGERY  2/27/2004    Creation of large mid-muscular VSD; [facility]    FONTAN PROCEDURE, EXTRACARDIAC  10/15/2003    18mm [place]-Roberto and complete ligation of PA    PACEMAKER GENERATOR CH[facility]3/[facility]    PACEMAKER GENERATOR CHANGE  3/18/2015    [facility]       Review of Systems    GENERAL: No fever, chills, fatigability, malaise  or weight loss.  CHEST: Denies dyspnea on exertion, cyanosis, wheezing, cough, sputum production   CARDIOVASCULAR: Denies chest pain, palpitations, diaphoresis,  or reduced exercise tolerance.  ABDOMEN: Appetite normal. Denies diarrhea, abdominal pain, nausea or vomiting.  PERIPHERAL VASCULAR: No edema or cyanosis.  NEUROLOGIC: no dizziness, no syncope , no headache   MUSCULOSKELETAL: Denies muscle weakness, joint pain  PSYCHOLOGICAL/BEHAVIORAL: Denies anxiety, severe stress, confusion  SKIN: no rashes, lesions  HEMATOLOGIC: Denies any abnormal bruising or bleeding, denies sickle cell trait or disease  ALLERGY/IMMUNOLOGIC: Denies any environmental allergies.     Objective:   /78 (BP Location: Right arm, Patient Position: Sitting, BP Method: Large (Manual))   Pulse (!) 55   Resp 18   Ht 5' 9.69" (1.77 m)   Wt 72.3 kg (159 lb 8 oz)   SpO2 95%   BMI 23.09 kg/m²     Physical Exam  GENERAL: Awake, well-developed well-nourished, no apparent distress  HEENT: mucous membranes moist and pink, normocephalic, no cranial or carotid bruits, sclera anicteric  CHEST: Good air movement, clear to auscultation bilaterally. Pacer in RUMQ  CARDIOVASCULAR: bradycardia (rate increases " standing), single S1, loud/palpable S2, normal P2, No S3 or S4, no rubs or gallops. No clicks or rumbles. No cardiomegaly by palpation. Systolic murmur at the LSB and [place] likely TR and MR.   ABDOMEN: Soft, nontender nondistended, no hepatosplenomegaly, no aortic bruits  EXTREMITIES: Warm well perfused, 2+ brachial/femoral pulses, capillary refill <3 seconds, no clubbing, cyanosis, or edema  NEURO: Alert and oriented, cooperative with exam, face symmetric, moves all extremities well.    Tests:   Today's EKG interpretation by Dr. Frederick reveals:   RAD   Rare sinus and Junctional escape beats  AV dissociation  Abnormal V leads  Abn EKG  (Final report in electronic medical record)    Echocardiogram:   Pertinent findings from the Echo dated 8/4/20 are:   L-TGA s/p Fontan with surgically created VSD, sub-pulmonary membrane, and surgical AV block s/p epicardial pacemaker.  The study was technically difficult with many images being suboptimal in quality.  Intact IVC to Fontan tunnel. The tunnel lateral to the atrium is not well seen. The SVC/Calvin is not welll seen. The pulmonary  arteries are not well seen.  Prior echo with large atrial level communication, not well seen today.  Large mid-muscular VSD with bidirectional shunt.  The right sided AV valve is the mitral valve. Mild mitral valve insufficiency.  The left sided AV valve is the tricuspid valve. Systemic tricuspid valve. Mild tricuspid valve insufficiency.  The small pulmonary valve was not evaluated with color or pulse wave Doppler, unclear if there is antegrade pulmoanry blood  flow.  Large aortic valve annulus. Trivial aortic valve insufficiency.  The aortic arch is not well seen. There is flow in the arch with color Doppler with normal Doppler velocity.  No pericardial effusion.  The LV is right sided. Septal dyskinesis. Limited views of the LV free wall with overall impression of mildly decreased LV  systolic function.  The systemic RV is left sided. Dilated  right ventricle, moderate. Thickened right ventricle free wall, moderate. Overall  impression of low normal/mildly decreased RV function.  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  Limited views of the aortic and pulmonary outflows.  (Full report in electronic medical record)    Holter/Event results from 8/4/20 are:  Predominant rhythm is sinus with junctional occasionally  Rare PVCs  No pacing  No diary symptoms  Predominant Rhythm  Sinus rhythm with heart rates varying between 34 and 159 bpm with an average of 61 bpm.   Pacemaker Function  Ventricular pacer was noted. Mode VVI.       7/31/18 2/25/19 4/10/19 8/5/2020   CBC Hgb 14.2  Hct 47.2  Plt 151 Hgb 14.1  Hct 43.2  Plt 147 L Hgb 15.0  Hct 48.6  Plt 189 Hgb 14.3  Hct 45.5  Plt 170  MCV 75L  MCHC 31.4 L  MCH 23.5 L   CMP WNL WNL   WNL   Mg 2.4, WNL 2.3, WNL   2.1, WNL   BNP 15, WNL     16, WNL    H     104 H   PT/INR PT 15.9H INR 1.3     11.7 / 1.1   TSH 2.150, WNL     2.020 WNL     Abdominal ultrasound dated 8/10/2020 was unremarkable save for possible cortical thinning of the right kidney.  This was reviewed with Dr. Aryan Rod and unchanged from the previous 2 studies.      Assessment:  1. 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 S/P Fontan procedure    3. S/P division of vascular ring    4. S/P cardiac pacemaker procedure    5. Pacemaker malfunction, sequela    6. Abnormal EKG    7. Non-rheumatic mitral regurgitation    8. Non-rheumatic tricuspid valve insufficiency    9. 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 Occasional tremors    12. History of abnormal platelets    13. VSD (ventricular septal defect)        Discussion/Plan:   Beulah Galindo is a 20 y.o. male with the above history.  He is doing well from a cardiac standpoint, asymptomatic.  He is not very active at present.  Have set a reminder for biannual Fontan labs which will be due in February and include a urinalysis  (suggested for cortical thinning noted on US), CBC, CMP, and Mag level.  Will repeat echo in August of 2021 pending course. Dr. Norris suggested stress test which did not get scheduled after his last visit with her.  He has not had a stress in quite some time so we we will schedule in the near future.  Saturation today is 95%, stable.  EKG remains abnormal but not significantly changed. Encouraged regular follow up with Dr. Cade in [place]. We discussed Fontan anatomy and sequela at length. Dicussed the need to report any chronic cough or diarrhea. Mom asked appropriate questions and all were answered to her satisfaction.     I have reviewed our general guidelines related to cardiac issues with the family.  I instructed them in the event of an emergency to call 911 or go to the nearest emergency room.  They know to contact the PCP if problems arise or if they are in doubt. The patient should see a dentist every 6 months for routine dental care.    Follow up with the primary care provider for the following issues: Nothing identified.    Activity: He should be allowed to set his own pace and rest if fatigued.  Regarding COVID, he is considered at increased risk due to his age and history of complex CHD. We have recommended vigilance for the family as they follow CDC recommendations.     Selective endocarditis prophylaxis is recommended in this circumstance.     I spent over 30 minutes with the patient. Over 50% of the time was spent counseling the patient and family member.    Patient or family member was asked to call the office within 3 days of any testing for results.     Dr. Frederick reviewed history and physical exam. He then performed the physical exam. He discussed the findings with the patient's caregiver(s), and answered all questions. I have reviewed our general guidelines related to cardiac issues with the family. I instructed them in the event of an emergency to call 911 or go to the nearest emergency  room. They know to contact the PCP if problems arise or if they are in doubt.    Medications:   Current Outpatient Medications   Medication Sig    aspirin 81 MG Chew Take 81 mg by mouth once daily.     No current facility-administered medications for this visit.         Orders:   Orders Placed This Encounter   Procedures    EKG 12-lead    Echocardiogram pediatric     Follow-Up:     Return to clinic in one year with EKG or sooner if there are any concerns.       Sincerely,  Alex Frederick MD    Note Contributing Authors:  MD Ariel Myrick FNP-EDUARDO  This documentation was created using [employer] voice recognition software. Content is subject to voice recognition errors.    11/25/2020    Attestation: Alex Frederick MD    I have reviewed the records and agree with the above.

## 2020-11-24 NOTE — TELEPHONE ENCOUNTER
Called mother as requested; provided [facility] December dates for Dr Cade; mother acknowledged; scheduled appointment for Monday, 12/28 at 11:30 am; provided clinic address; e-mailed copy of [facility] instructions & directions to mother; also placed copy along with appointment reminder slip in mail to mother

## 2020-11-24 NOTE — TELEPHONE ENCOUNTER
----- Message from Ariel Sandoval NP sent at 11/24/2020  3:58 PM CST -----  Seeing pt today and mom does not know date for [facility]. Can you call her? [phone]. No hurry! Thank you.  Ariel

## 2020-11-24 NOTE — LETTER
November 25, 2020      [facility]  [address] [facility] [address]  [facility] [address]           [facility]  [address]  [facility] [address]  Phone: [phone]  Fax: [phone]          Patient: Beulah Galindo   MR Number: [id number]   YOB: 2000   Date of Visit: 11/24/2020       Dear [facility]:    Thank you for referring Beulah Galindo to me for evaluation. Attached you will find relevant portions of my assessment and plan of care.    If you have questions, please do not hesitate to call me. I look forward to following Beulah Galindo along with you.    Sincerely,    Ariel Sandoval, NP    Enclosure  CC:  No Recipients    If you would like to receive this communication electronically, please contact [email or web address] or [phone] to request more information on [employer] [facility] access.    For providers and/or their staff who would like to refer a patient to [facility], please contact us through our one-stop-shop provider referral line, Nicanor Rdz, at [phone].    If you feel you have received this communication in error or would no longer like to receive these types of communications, please e-mail [email or web address]

## 2020-12-04 ENCOUNTER — CLINICAL SUPPORT (OUTPATIENT)
Dept: PEDIATRIC CARDIOLOGY | Facility: CLINIC | Age: 20
End: 2020-12-04
Attending: PEDIATRICS
Payer: MEDICAID

## 2020-12-04 DIAGNOSIS — Q20.5 CONGENITALLY CORRECTED TGA (TRANSPOSITION OF GREAT ARTERIES): Chronic | ICD-10-CM

## 2020-12-04 DIAGNOSIS — I97.89 SURGICAL COMPLETE HEART BLOCK: ICD-10-CM

## 2020-12-04 DIAGNOSIS — I44.2 SURGICAL COMPLETE HEART BLOCK: ICD-10-CM

## 2020-12-04 DIAGNOSIS — Z98.890 S/P FONTAN PROCEDURE: Chronic | ICD-10-CM

## 2020-12-07 ENCOUNTER — DOCUMENTATION ONLY (OUTPATIENT)
Dept: PEDIATRIC CARDIOLOGY | Facility: CLINIC | Age: 20
End: 2020-12-07

## 2020-12-07 NOTE — PROGRESS NOTES
Dr. Frederick spoke with Dr. Norris. Pt is not requiring pacer. Lead is fractured anyway. Rhythms during stress are reassuring and he has not had syncope, fatigue, or sinus node dysfx. Plan for now to continue to watch for now. Mom encouraged to call with any fatigue or syncope. She verbalized understanding.

## 2020-12-28 PROBLEM — K76.1 CHRONIC PASSIVE CONGESTION OF LIVER: Status: ACTIVE | Noted: 2020-12-28

## 2020-12-28 PROBLEM — Z91.89 AT RISK FOR CANCER: Status: ACTIVE | Noted: 2020-12-28

## 2020-12-30 ENCOUNTER — TELEPHONE (OUTPATIENT)
Dept: PEDIATRIC GASTROENTEROLOGY | Facility: CLINIC | Age: 20
End: 2020-12-30

## 2020-12-30 DIAGNOSIS — Z98.890 S/P FONTAN PROCEDURE: Primary | Chronic | ICD-10-CM

## 2020-12-30 NOTE — TELEPHONE ENCOUNTER
Called mother; informed her that Dr Cade reviewed Beulah's lab result and US and his US looked good with no focal liver lesions noted; however, his lab was elevated so Dr Cade would like Beulah to repeat the lab and US every 3 months in 2021 with a follow-up clinic visit with him in [place] in December; mother verbalized understanding; scheduled US for March in [place] per mother's request; informed mother that she may get the lab drawn that same day; mother acknowledged plan

## 2020-12-30 NOTE — TELEPHONE ENCOUNTER
----- Message from Noel Cade MD sent at 12/30/2020  1:13 PM CST -----  His repeat US looks fine, no focal liver lesions.  Since his baseline AFP was elevated, let's do higher intensity surveillance this year:  q 3 mo AFP & RUQ US and plan to see me again in [place] 12/2021.  He can do the lab locally and the US wherever he did it this time.  I'll put in the next set of orders for both now.

## 2021-02-25 ENCOUNTER — TELEPHONE (OUTPATIENT)
Dept: PEDIATRIC CARDIOLOGY | Facility: CLINIC | Age: 21
End: 2021-02-25

## 2021-02-25 DIAGNOSIS — Z95.0 S/P CARDIAC PACEMAKER PROCEDURE: ICD-10-CM

## 2021-02-25 DIAGNOSIS — Z98.890 S/P FONTAN PROCEDURE: Primary | Chronic | ICD-10-CM

## 2021-02-25 DIAGNOSIS — Q20.5 CONGENITALLY CORRECTED TGA (TRANSPOSITION OF GREAT ARTERIES): Chronic | ICD-10-CM

## 2021-03-22 ENCOUNTER — CLINICAL SUPPORT (OUTPATIENT)
Dept: PEDIATRIC CARDIOLOGY | Facility: CLINIC | Age: 21
End: 2021-03-22
Payer: MEDICAID

## 2021-03-22 ENCOUNTER — CLINICAL SUPPORT (OUTPATIENT)
Dept: PEDIATRIC CARDIOLOGY | Facility: CLINIC | Age: 21
End: 2021-03-22
Attending: PEDIATRICS
Payer: MEDICAID

## 2021-03-22 ENCOUNTER — OFFICE VISIT (OUTPATIENT)
Dept: PEDIATRIC CARDIOLOGY | Facility: CLINIC | Age: 21
End: 2021-03-22
Payer: MEDICAID

## 2021-03-22 VITALS
OXYGEN SATURATION: 98 % | WEIGHT: 167.75 LBS | SYSTOLIC BLOOD PRESSURE: 142 MMHG | HEART RATE: 56 BPM | BODY MASS INDEX: 24.85 KG/M2 | HEIGHT: 69 IN | DIASTOLIC BLOOD PRESSURE: 62 MMHG

## 2021-03-22 VITALS
HEIGHT: 69 IN | BODY MASS INDEX: 24.85 KG/M2 | OXYGEN SATURATION: 98 % | RESPIRATION RATE: 20 BRPM | WEIGHT: 167.75 LBS | HEART RATE: 56 BPM | DIASTOLIC BLOOD PRESSURE: 70 MMHG | SYSTOLIC BLOOD PRESSURE: 114 MMHG

## 2021-03-22 DIAGNOSIS — I44.2 SURGICAL COMPLETE HEART BLOCK: ICD-10-CM

## 2021-03-22 DIAGNOSIS — Q20.5 CONGENITALLY CORRECTED TGA (TRANSPOSITION OF GREAT ARTERIES): Chronic | ICD-10-CM

## 2021-03-22 DIAGNOSIS — T82.111S: Primary | ICD-10-CM

## 2021-03-22 DIAGNOSIS — R94.31 ABNORMAL EKG: ICD-10-CM

## 2021-03-22 DIAGNOSIS — I97.89 SURGICAL COMPLETE HEART BLOCK: ICD-10-CM

## 2021-03-22 DIAGNOSIS — I44.2 SURGICAL COMPLETE HEART BLOCK: Primary | ICD-10-CM

## 2021-03-22 DIAGNOSIS — I97.89 SURGICAL COMPLETE HEART BLOCK: Primary | ICD-10-CM

## 2021-03-22 DIAGNOSIS — Q24.9 ADULT CONGENITAL HEART DISEASE: ICD-10-CM

## 2021-03-22 DIAGNOSIS — Z98.890 S/P FONTAN PROCEDURE: Chronic | ICD-10-CM

## 2021-03-22 PROCEDURE — 99214 PR OFFICE/OUTPT VISIT, EST, LEVL IV, 30-39 MIN: ICD-10-PCS | Mod: 95,S$PBB,, | Performed by: PEDIATRICS

## 2021-03-22 PROCEDURE — 93005 ELECTROCARDIOGRAM TRACING: CPT | Mod: PBBFAC | Performed by: PEDIATRICS

## 2021-03-22 PROCEDURE — 99214 OFFICE O/P EST MOD 30 MIN: CPT | Mod: 95,S$PBB,, | Performed by: PEDIATRICS

## 2021-03-31 ENCOUNTER — TELEPHONE (OUTPATIENT)
Dept: PEDIATRIC GASTROENTEROLOGY | Facility: CLINIC | Age: 21
End: 2021-03-31

## 2021-04-12 ENCOUNTER — TELEPHONE (OUTPATIENT)
Dept: PEDIATRIC GASTROENTEROLOGY | Facility: CLINIC | Age: 21
End: 2021-04-12

## 2021-06-16 ENCOUNTER — TELEPHONE (OUTPATIENT)
Dept: PEDIATRIC GASTROENTEROLOGY | Facility: CLINIC | Age: 21
End: 2021-06-16

## 2021-09-21 ENCOUNTER — TELEPHONE (OUTPATIENT)
Dept: PEDIATRIC GASTROENTEROLOGY | Facility: CLINIC | Age: 21
End: 2021-09-21

## 2021-10-21 NOTE — PATIENT INSTRUCTIONS
Alex Frederick MD  Pediatric Cardiology  [address] [place] [address]  [phone]    General Guidelines    Name: Beulah Galindo                   : 2000    Diagnosis:   1. S/P cardiac pacemaker procedure    2. Surgical complete heart block    3. Congenitally corrected TGA (transposition of great arteries)    4. VSD (ventricular septal defect)    5. S/P Fontan procedure        PCP: Frances Cat MD  PCP Phone Number: [phone]    · If you have an emergency or you think you have an emergency, go to the nearest emergency room!     · Breathing too fast, doesnt look right, consistently not eating well, your child needs to be checked. These are general indications that your child is not feeling well. This may be caused by anything, a stomach virus, an ear ache or heart disease, so please call Frances Cat MD. If Frances Cat MD thinks you need to be checked for your heart, they will let us know.     · If your child experiences a rapid or very slow heart rate and has the following symptoms, call Frances Cat MD or go to the nearest emergency room.   · unexplained chest pain   · does not look right   · feels like they are going to pass out   · actually passes out for unexplained reasons   · weakness or fatigue   · shortness of breath  or breathing fast   · consistent poor feeding     · If your child experiences a rapid or very slow heart rate that lasts longer than 30 minutes call Frances Cat MD or go to the nearest emergency room.     · If your child feels like they are going to pass out - have them sit down or lay down immediately. Raise the feet above the head (prop the feet on a chair or the wall) until the feeling passes. Slowly allow the child to sit, then stand. If the feeling returns, lay back down and start over.              It is very important that you notify Frances Cat MD first. Frances Cat MD or the ER Physician can reach Dr. Frederick at the  office or through [facility] at [phone] as needed.    PREVENTION OF BACTERIAL ENDOCARDITIS (selective IE)    A COPY OF THIS SHEET MUST BE GIVEN TO ALL OF YOUR DOCTORS OR HEALTH CARE PROVIDERS    You have received this information because you are at an increased risk for developing adverse outcomes from infective endocarditis (IE), also known as subacute bacterial endocarditis (SBE).    Patient Name:  [unfilled]  : 2000  Diagnosis:   1. S/P cardiac pacemaker procedure    2. Surgical complete heart block    3. Congenitally corrected TGA (transposition of great arteries)    4. VSD (ventricular septal defect)    5. S/P Fontan procedure        As of 2017, Alex Frederick MD, Pediatric Cardiologist recommends that Maye receive SELECTIVE USE of antibiotic prophylaxis from bacterial endocarditis.    Antibiotic prophylaxis with dental or surgical procedures is recommended in selected instances if your dentist, surgeon or physician believes there is a greater risk of infection.  For example:  1) Any significantly infected operative field (Example: dental abscess or ruptured appendix) which may increase the bacterial load to the blood stream during the procedure; 2) Benefits of antibiotic coverage should be weighed against risk of allergic reactions and anaphylaxis; therefore, their use should be carefully selected based on individual cases.     Antibiotic prophylaxis is NOT recommended for the following dental procedures or events: routine anesthetic injections through non-infected tissue; taking dental radiographs; placement of removable prosthodontic or orthodontic appliances; adjustment of orthodontic appliances; placement of orthodontic brackets; and shedding of deciduous teeth or bleeding from trauma to the lips or oral mucosa.   If recommended by the Health Care Provider - Antibiotic Prophylactic Regimens   Regimen - Single Dose 30-60 minutes before Procedure  Situation Agent  Adults Children   Oral Amoxicillin 2g 50/mg/kg   Unable to take oral meds Ampicillin   OR  Cefazolin or ceftriaxone 2 g IM or IV1    1 g IM or IV 50 mg/kg IM or IV    50 mg/kg IM or IV   Allergic to Penicillins or ampicillin-Oral regimen Cephalexin 2  OR  Clindamycin  OR  Azithromycin or clarithromycin 2 g    600 mg    500 mg 50 mg/kg    20 mg/kg    15 mg/kg   Allergic to penicillin or ampicillin and unable to take oral medications Cefazolin or ceftriaxone 3  OR  Clindamycin 1 g IM or IV    600 mg IM or IV 50 mg/kg IM or IV    20 mg/kg IM or IV   1IM - intramuscular; IV - intravenous  2Or other first or second generation oral cephalosporin in equivalent adult or pediatric dosage.  3Cephalosporins should not be used in an individual with a history of anaphylaxis, angioedema or urticaria with penicillin or ampicillin.   Adapted from Prevention of Infective Endocarditis: Guidelines From the American Heart Association, by the Committee on Rheumatic Fever, Endocarditis, and Kawasaki Disease. Circulation, e-published April 19, 2007. Go to www.americanheart.org/presenter for more information.    The practice of giving patients antibiotics prior to a dental procedure is no longer recommended EXCEPT for patients with the highest risk of adverse outcomes resulting from bacterial endocarditis. We cannot exclude the possibility that an exceedingly small number of cases, if any, of bacterial endocarditis may be prevented by antibiotic prophylaxis prior to a dental procedure. The importance of good oral and dental health and regular visits to the dentist is important for patients at risk for bacterial endocarditis.  Gastrointestinal (GI)/Genitourinary () Procedures: Antibiotic prophylaxis solely to prevent bacterial endocarditis is no longer recommended for patients who undergo a GI or  tract procedures, including patients with the highest risk of adverse outcomes due to bacterial endocarditis.    Good dental health and  hygiene is very effective in preventing bacterial endocarditis.   Always practice good dental health!     Gen: No acute distress, non toxic  HEENT: NCAT, Mucous membranes moist, pink conjunctivae, EOMI, PERRL. R TM unremarkable, Left ear canal edematous, pain with retraction of pinna, unable to visualize TM. No mastoid ttp.   CV: RRR, nl s1/s2.  Resp: CTAB, normal rate and effort  Neuro: A&O x 3, moving all 4 extremities  MSK: No spine or joint tenderness to palpation  Skin: No rashes. intact and perfused.
